# Patient Record
(demographics unavailable — no encounter records)

---

## 2024-10-08 NOTE — HISTORY OF PRESENT ILLNESS
[FreeTextEntry1] : pt here for management of bilateral subdural  and tonsilar cancer and hld  [de-identified] : the bilateral subdural hematoma showed up on PET SCAN PT NOW BEEING TAPER OFF DEXAMETHASONE  WIFE IN REHAB FELL AND WAS TAKEN TO BRADY LEGS ARE WEAK

## 2024-10-08 NOTE — PHYSICAL EXAM
[No Acute Distress] : no acute distress [PERRL] : pupils equal round and reactive to light [Normal TMs] : both tympanic membranes were normal [Supple] : supple [Clear to Auscultation] : lungs were clear to auscultation bilaterally [Regular Rhythm] : with a regular rhythm [No Edema] : there was no peripheral edema [Normal] : no posterior cervical lymphadenopathy and no anterior cervical lymphadenopathy [No CVA Tenderness] : no CVA  tenderness [No Joint Swelling] : no joint swelling [No Skin Lesions] : no skin lesions [No Focal Deficits] : no focal deficits [Normal Insight/Judgement] : insight and judgment were intact [de-identified] : SYSTOLIC MURMUR AND ECTOPY  [de-identified] : LARGE LEFT INGUINAL HERNIA

## 2024-10-08 NOTE — ASSESSMENT
[FreeTextEntry1] : SUBDURAL TAPER AND DC DECADRON DECREASING IN SIZE NOW 3 MM HLD CONTINUE ATORVASTATIN AS   F/U WITH CARDIOLOGY

## 2024-12-16 NOTE — HISTORY OF PRESENT ILLNESS
[Coronary Artery Disease] : Coronary Artery Disease [Hyperlipidemia] : Hyperlipidemia [Other: ___] : [unfilled]: [FreeTextEntry6] : had CABS 3/6/23 4 vessels had  perioperative AF  RESPONDED TO IV AMIODARONE no cp no sob no cp no sob seeing cardiology nest month  no af off elilquis       [Does not check BP] : The patient is not checking blood pressure [<130/90] : Target blood pressure is  <130/90 [Target goal met] : BP target goal met [Doing Well] : Patient is doing well [Moderate Intensity Therapy] : Patient is currently on moderate intensity statin  therapy [Symptoms Limit Activities] : Symptoms limit ~his/her~ activities [Stable] : Overall status has been stable

## 2024-12-16 NOTE — PHYSICAL EXAM
[No Acute Distress] : no acute distress [PERRL] : pupils equal round and reactive to light [Normal TMs] : both tympanic membranes were normal [Supple] : supple [Clear to Auscultation] : lungs were clear to auscultation bilaterally [Regular Rhythm] : with a regular rhythm [No Edema] : there was no peripheral edema [Normal] : no posterior cervical lymphadenopathy and no anterior cervical lymphadenopathy [No CVA Tenderness] : no CVA  tenderness [No Joint Swelling] : no joint swelling [No Skin Lesions] : no skin lesions [No Focal Deficits] : no focal deficits [Normal Insight/Judgement] : insight and judgment were intact [de-identified] : SYSTOLIC MURMUR AND ECTOPY  [de-identified] : LARGE LEFT INGUINAL HERNIA

## 2025-03-05 NOTE — HISTORY OF PRESENT ILLNESS
[Coronary Artery Disease] : Coronary Artery Disease [Hyperlipidemia] : Hyperlipidemia [Other: ___] : [unfilled]: [Does not check BP] : The patient is not checking blood pressure [<130/90] : Target blood pressure is  <130/90 [Target goal met] : BP target goal met [Doing Well] : Patient is doing well [Moderate Intensity Therapy] : Patient is currently on moderate intensity statin  therapy [Symptoms Limit Activities] : Symptoms limit ~his/her~ activities [Stable] : Overall status has been stable [FreeTextEntry6] : feeling well  no headaches no dizziness no falls no cp no sob

## 2025-03-05 NOTE — PHYSICAL EXAM
[No Acute Distress] : no acute distress [PERRL] : pupils equal round and reactive to light [Normal TMs] : both tympanic membranes were normal [Supple] : supple [Clear to Auscultation] : lungs were clear to auscultation bilaterally [Regular Rhythm] : with a regular rhythm [No Edema] : there was no peripheral edema [Normal] : no posterior cervical lymphadenopathy and no anterior cervical lymphadenopathy [No CVA Tenderness] : no CVA  tenderness [No Joint Swelling] : no joint swelling [No Skin Lesions] : no skin lesions [No Focal Deficits] : no focal deficits [Normal Insight/Judgement] : insight and judgment were intact [de-identified] : SYSTOLIC MURMUR   [de-identified] : LARGE LEFT INGUINAL HERNIA

## 2025-05-22 NOTE — HISTORY OF PRESENT ILLNESS
[Coronary Artery Disease] : Coronary Artery Disease [Hyperlipidemia] : Hyperlipidemia [Other: ___] : [unfilled]: [FreeTextEntry6] : feeling well  no headaches no dizziness no falls no cp no sob      [Does not check BP] : The patient is not checking blood pressure [<130/90] : Target blood pressure is  <130/90 [Target goal met] : BP target goal met [Doing Well] : Patient is doing well [Moderate Intensity Therapy] : Patient is currently on moderate intensity statin  therapy [Symptoms Limit Activities] : Symptoms limit ~his/her~ activities [Stable] : Overall status has been stable

## 2025-05-22 NOTE — PHYSICAL EXAM
[No Acute Distress] : no acute distress [PERRL] : pupils equal round and reactive to light [Normal TMs] : both tympanic membranes were normal [Supple] : supple [Clear to Auscultation] : lungs were clear to auscultation bilaterally [Regular Rhythm] : with a regular rhythm [No Edema] : there was no peripheral edema [Normal] : no posterior cervical lymphadenopathy and no anterior cervical lymphadenopathy [No CVA Tenderness] : no CVA  tenderness [No Joint Swelling] : no joint swelling [No Skin Lesions] : no skin lesions [No Focal Deficits] : no focal deficits [Normal Insight/Judgement] : insight and judgment were intact [de-identified] : SYSTOLIC MURMUR   [de-identified] : LARGE LEFT INGUINAL HERNIA

## 2025-05-22 NOTE — ASSESSMENT
[FreeTextEntry1] : cad  continue asa  hld continue simvastatin bph continue tamsulosin AS asymptomatic